# Patient Record
Sex: FEMALE | Race: WHITE | HISPANIC OR LATINO
[De-identification: names, ages, dates, MRNs, and addresses within clinical notes are randomized per-mention and may not be internally consistent; named-entity substitution may affect disease eponyms.]

---

## 2017-09-01 ENCOUNTER — APPOINTMENT (OUTPATIENT)
Dept: VASCULAR SURGERY | Facility: CLINIC | Age: 67
End: 2017-09-01
Payer: MEDICARE

## 2017-09-01 DIAGNOSIS — I83.819 VARICOSE VEINS OF UNSPECIFIED LOWER EXTREMITY WITH PAIN: ICD-10-CM

## 2017-09-01 PROCEDURE — 99213 OFFICE O/P EST LOW 20 MIN: CPT

## 2017-09-01 RX ORDER — FLUTICASONE PROPIONATE 110 UG/1
110 AEROSOL, METERED RESPIRATORY (INHALATION)
Qty: 12 | Refills: 0 | Status: ACTIVE | COMMUNITY
Start: 2017-03-09

## 2017-09-01 RX ORDER — KETOCONAZOLE 20 MG/G
2 CREAM TOPICAL
Qty: 30 | Refills: 0 | Status: ACTIVE | COMMUNITY
Start: 2016-12-14

## 2019-05-29 ENCOUNTER — APPOINTMENT (OUTPATIENT)
Dept: NEUROLOGY | Facility: CLINIC | Age: 69
End: 2019-05-29
Payer: MEDICARE

## 2019-05-29 VITALS
TEMPERATURE: 97.7 F | DIASTOLIC BLOOD PRESSURE: 80 MMHG | BODY MASS INDEX: 45.45 KG/M2 | HEART RATE: 60 BPM | WEIGHT: 247 LBS | HEIGHT: 62 IN | SYSTOLIC BLOOD PRESSURE: 141 MMHG | OXYGEN SATURATION: 96 %

## 2019-05-29 DIAGNOSIS — R42 DIZZINESS AND GIDDINESS: ICD-10-CM

## 2019-05-29 PROCEDURE — 99205 OFFICE O/P NEW HI 60 MIN: CPT

## 2019-05-29 RX ORDER — AZITHROMYCIN 250 MG/1
250 TABLET, FILM COATED ORAL
Qty: 6 | Refills: 0 | Status: COMPLETED | COMMUNITY
Start: 2017-05-19 | End: 2019-05-29

## 2019-05-29 RX ORDER — BENZONATATE 200 MG/1
200 CAPSULE ORAL
Qty: 10 | Refills: 0 | Status: DISCONTINUED | COMMUNITY
Start: 2017-03-09 | End: 2019-05-29

## 2019-05-29 RX ORDER — NEOMYCIN SULFATE, POLYMYXIN B SULFATE, HYDROCORTISONE 3.5; 10000; 1 MG/ML; [USP'U]/ML; MG/ML
1 SOLUTION/ DROPS AURICULAR (OTIC)
Qty: 10 | Refills: 0 | Status: DISCONTINUED | COMMUNITY
Start: 2017-03-09 | End: 2019-05-29

## 2019-05-29 RX ORDER — METOPROLOL SUCCINATE 25 MG/1
25 TABLET, EXTENDED RELEASE ORAL DAILY
Refills: 0 | Status: ACTIVE | COMMUNITY

## 2019-05-29 RX ORDER — POLYETHYLENE GLYCOL-3350, SODIUM CHLORIDE, POTASSIUM CHLORIDE AND SODIUM BICARBONATE 420; 11.2; 5.72; 1.48 G/438.4G; G/438.4G; G/438.4G; G/438.4G
420 POWDER, FOR SOLUTION ORAL
Qty: 1 | Refills: 0 | Status: DISCONTINUED | COMMUNITY
Start: 2017-04-14 | End: 2019-05-29

## 2019-05-29 NOTE — ASSESSMENT
[FreeTextEntry1] : Ms. Rahman suffers from a combination of lumbar spondylosis, probable bilateral carpal tunnel syndrome, CMC arthritis, and vertigo. \par Will check labs for neuropathy today, have her return for NCS/EMG\par Likely will refer to hand surgery after that\par There is no evidence of myelopathy on exam or MRI

## 2019-05-29 NOTE — CONSULT LETTER
[Dear  ___] : Dear  [unfilled], [Consult Letter:] : I had the pleasure of evaluating your patient, [unfilled]. [Please see my note below.] : Please see my note below. [Consult Closing:] : Thank you very much for allowing me to participate in the care of this patient.  If you have any questions, please do not hesitate to contact me. [Sincerely,] : Sincerely, [FreeTextEntry3] : Jayant Paige M.D.\par Neurology, Electromyography and Neuromuscular Medicine\par [facility]\par \par  of Neurology\par [facility] [employer]

## 2019-05-29 NOTE — PHYSICAL EXAM
[FreeTextEntry1] : Gen: appears well, well-nourished, no acute distress\par \par MS: awake, alert, speech fluent, comprehension intact, follows commands\par \par CN: PERRL, EOMI, visual fields full, facial strength and sensation intact and symmetric, palate elevation symmetric, tongue midline, no tongue atrophy or fasciculations\par \par Motor: bilateral thenar atrophy; APB 4/5 b/l; left FDI/ADM 4+ otherwise 5/5 throughout. no abnormal movements\par \par Sensory: vibration moderately reduced at toes and ankles; JPS intact toes b/l\par \par Reflexes: 2+ symmetric throughout except left patellar 1+, no Villalba’s sign, plantar responses flexor bilaterally\par \par Coordination: no dysmetria on finger to nose, Romberg negative\par \par Gait: narrow base, slow, overly cautious, not ataxic\par \par Skin: no rash on extremities\par \par Ext: no edema\par \par MSK: Tinel's sign absent at wrists and elbows; significant tenderness of CMC b/l; Finkelstein's test negative b/l

## 2019-05-30 ENCOUNTER — TRANSCRIPTION ENCOUNTER (OUTPATIENT)
Age: 69
End: 2019-05-30

## 2019-06-05 ENCOUNTER — APPOINTMENT (OUTPATIENT)
Dept: NEUROLOGY | Facility: CLINIC | Age: 69
End: 2019-06-05

## 2019-06-07 ENCOUNTER — TRANSCRIPTION ENCOUNTER (OUTPATIENT)
Age: 69
End: 2019-06-07

## 2019-06-07 LAB
ALBUMIN MFR SERPL ELPH: 53.3 %
ALBUMIN SERPL ELPH-MCNC: 4.4 G/DL
ALBUMIN SERPL-MCNC: 3.9 G/DL
ALBUMIN/GLOB SERPL: 1.1 RATIO
ALP BLD-CCNC: 113 U/L
ALPHA1 GLOB MFR SERPL ELPH: 4.5 %
ALPHA1 GLOB SERPL ELPH-MCNC: 0.3 G/DL
ALPHA2 GLOB MFR SERPL ELPH: 11.4 %
ALPHA2 GLOB SERPL ELPH-MCNC: 0.8 G/DL
ALT SERPL-CCNC: 19 U/L
ANION GAP SERPL CALC-SCNC: 13 MMOL/L
AST SERPL-CCNC: 16 U/L
B-GLOBULIN MFR SERPL ELPH: 13.7 %
B-GLOBULIN SERPL ELPH-MCNC: 1 G/DL
BASOPHILS # BLD AUTO: 0.04 K/UL
BASOPHILS NFR BLD AUTO: 0.5 %
BILIRUB SERPL-MCNC: 0.3 MG/DL
BUN SERPL-MCNC: 18 MG/DL
CALCIUM SERPL-MCNC: 9.5 MG/DL
CHLORIDE SERPL-SCNC: 104 MMOL/L
CO2 SERPL-SCNC: 23 MMOL/L
COPPER SERPL-MCNC: 149 UG/DL
CREAT SERPL-MCNC: 0.88 MG/DL
EOSINOPHIL # BLD AUTO: 0.11 K/UL
EOSINOPHIL NFR BLD AUTO: 1.3 %
ERYTHROCYTE [SEDIMENTATION RATE] IN BLOOD BY WESTERGREN METHOD: 49 MM/HR
ESTIMATED AVERAGE GLUCOSE: 117 MG/DL
FOLATE SERPL-MCNC: 4.2 NG/ML
GAMMA GLOB FLD ELPH-MCNC: 1.3 G/DL
GAMMA GLOB MFR SERPL ELPH: 17.1 %
GLUCOSE SERPL-MCNC: 89 MG/DL
HBA1C MFR BLD HPLC: 5.7 %
HCT VFR BLD CALC: 38.9 %
HGB BLD-MCNC: 11.9 G/DL
HOMOCYSTEINE LEVEL: 15 UMOL/L
IMM GRANULOCYTES NFR BLD AUTO: 0.3 %
INTERPRETATION SERPL IEP-IMP: NORMAL
LYMPHOCYTES # BLD AUTO: 2.85 K/UL
LYMPHOCYTES NFR BLD AUTO: 32.4 %
M PROTEIN SPEC IFE-MCNC: NORMAL
MAN DIFF?: NORMAL
MCHC RBC-ENTMCNC: 27.2 PG
MCHC RBC-ENTMCNC: 30.6 GM/DL
MCV RBC AUTO: 88.8 FL
METHYLMALONIC ACID LEVEL: 230 NMOL/L
MONOCYTES # BLD AUTO: 0.54 K/UL
MONOCYTES NFR BLD AUTO: 6.1 %
NEUTROPHILS # BLD AUTO: 5.22 K/UL
NEUTROPHILS NFR BLD AUTO: 59.4 %
PLATELET # BLD AUTO: 233 K/UL
POTASSIUM SERPL-SCNC: 4.5 MMOL/L
PROT SERPL-MCNC: 7.4 G/DL
RBC # BLD: 4.38 M/UL
RBC # FLD: 14.6 %
SODIUM SERPL-SCNC: 140 MMOL/L
TSH SERPL-ACNC: 1.54 UIU/ML
VIT B12 SERPL-MCNC: 646 PG/ML
WBC # FLD AUTO: 8.79 K/UL

## 2019-08-14 ENCOUNTER — APPOINTMENT (OUTPATIENT)
Dept: NEUROLOGY | Facility: CLINIC | Age: 69
End: 2019-08-14
Payer: MEDICARE

## 2019-08-14 VITALS
HEIGHT: 62 IN | OXYGEN SATURATION: 99 % | WEIGHT: 257 LBS | TEMPERATURE: 98.3 F | SYSTOLIC BLOOD PRESSURE: 136 MMHG | BODY MASS INDEX: 47.29 KG/M2 | HEART RATE: 67 BPM | DIASTOLIC BLOOD PRESSURE: 79 MMHG

## 2019-08-14 DIAGNOSIS — M25.531 PAIN IN RIGHT WRIST: ICD-10-CM

## 2019-08-14 DIAGNOSIS — M19.049 PRIMARY OSTEOARTHRITIS, UNSPECIFIED HAND: ICD-10-CM

## 2019-08-14 DIAGNOSIS — M25.532 PAIN IN RIGHT WRIST: ICD-10-CM

## 2019-08-14 DIAGNOSIS — R20.0 ANESTHESIA OF SKIN: ICD-10-CM

## 2019-08-14 PROCEDURE — 95886 MUSC TEST DONE W/N TEST COMP: CPT | Mod: 59

## 2019-08-14 PROCEDURE — 95913 NRV CNDJ TEST 13/> STUDIES: CPT

## 2019-08-14 PROCEDURE — 99214 OFFICE O/P EST MOD 30 MIN: CPT

## 2019-08-14 NOTE — HISTORY OF PRESENT ILLNESS
[FreeTextEntry1] : Symptoms are unchanged. She still has a lot of pain in her back along with numbness / tingling in the legs when she walks. She is taking tramadol which does help to some degree. \par She still has pain in her wrists, worse on the left, worse with activity.

## 2019-08-14 NOTE — PROCEDURE
[FreeTextEntry3] : Electro Physiologic Findings:\par \par Limb temperature was monitored and maintained at approximately 30 – 34° C in the lower extremities, and 32 – 36° C in the upper extremities.\par \par The right median mixed nerve response was slow across the wrist; the left median sensory velocities were borderline. Otherwise the median sensory and motor responses were normal bilaterally. The right lumbrical study was mildly positive, while on the left it was negative. \par \par The sural and superficial fibular sensory responses were normal. The right fibular motor amplitude was borderline, with normal velocity and F-wave latency. The right tibial motor amplitude was low, with prolonged F-wave latency. \par \par Needle electromyography was performed on select right upper and lower extremity appendicular muscles and the right C5/6 and L4/5 paraspinals. There was submaximal activation pattern in the tibialis anterior and peroneus longus muscles. Otherwise all muscles tested were normal without evidence of active or chronic denervation. \par \par Clinical Electrophysiological Impression: \par \par This electrodiagnostic study demonstrated a mild median nerve entrapment on the right. There were also findings that could be consistent with a chronic right S1 radiculopathy, or an early / mild motor-predominant polyneuropathy. There was no definite evidence of right cervical radiculopathy, although the sensitivity of this study for diagnosing radiculopathy is somewhat limited.  [FreeTextEntry1] : Nerve Conduction and Electromyography Report

## 2019-08-14 NOTE — PROCEDURE
[FreeTextEntry1] : Nerve Conduction and Electromyography Report [FreeTextEntry3] : Electro Physiologic Findings:\par \par Limb temperature was monitored and maintained at approximately 30 – 34° C in the lower extremities, and 32 – 36° C in the upper extremities.\par \par The right median mixed nerve response was slow across the wrist; the left median sensory velocities were borderline. Otherwise the median sensory and motor responses were normal bilaterally. The right lumbrical study was mildly positive, while on the left it was negative. \par \par The sural and superficial fibular sensory responses were normal. The right fibular motor amplitude was borderline, with normal velocity and F-wave latency. The right tibial motor amplitude was low, with prolonged F-wave latency. \par \par Needle electromyography was performed on select right upper and lower extremity appendicular muscles and the right C5/6 and L4/5 paraspinals. There was submaximal activation pattern in the tibialis anterior and peroneus longus muscles. Otherwise all muscles tested were normal without evidence of active or chronic denervation. \par \par Clinical Electrophysiological Impression: \par \par This electrodiagnostic study demonstrated a mild median nerve entrapment on the right. There were also findings that could be consistent with a chronic right S1 radiculopathy, or an early / mild motor-predominant polyneuropathy. There was no definite evidence of right cervical radiculopathy, although the sensitivity of this study for diagnosing radiculopathy is somewhat limited.

## 2019-08-14 NOTE — PHYSICAL EXAM
[FreeTextEntry1] : Gen: appears well, well-nourished, no acute distress\par \par MS: awake, alert, speech fluent, comprehension intact, follows commands\par \par Gait: antalgic, needs cane, stops due to severe pain after every few steps\par \par MSK: significant tenderness of CMC b/l

## 2019-08-14 NOTE — ASSESSMENT
[FreeTextEntry1] : Back pain due to lumbar spondylosis and spinal stenosis, without radiculopathy - significantly limiting her gait, can only walk a few steps before stopping\par This condition is likely permanent and is causing significant disability\par \par NCS/EMG performed today did not show significant radiculopathy in either the right arm or right leg. There was mild median nerve entrapment on the right, but her wrist pain is worse on the left where there was no median nerve entrapment. I suspect the wrist pain is predominantly due to arthritis and not carpal tunnel syndrome. \par \par I recommended that she see her rheumatologist for arthritis treatment. She has f/u with Dr. Chan to discuss potential surgical options for lumbar spine. \par \par See separate procedure note for full results of study.

## 2019-08-23 ENCOUNTER — TRANSCRIPTION ENCOUNTER (OUTPATIENT)
Age: 69
End: 2019-08-23

## 2021-09-10 ENCOUNTER — APPOINTMENT (OUTPATIENT)
Dept: NEUROLOGY | Facility: CLINIC | Age: 71
End: 2021-09-10
Payer: MEDICARE

## 2021-09-10 VITALS
TEMPERATURE: 98 F | HEART RATE: 74 BPM | DIASTOLIC BLOOD PRESSURE: 85 MMHG | OXYGEN SATURATION: 96 % | SYSTOLIC BLOOD PRESSURE: 126 MMHG

## 2021-09-10 DIAGNOSIS — E53.8 DEFICIENCY OF OTHER SPECIFIED B GROUP VITAMINS: ICD-10-CM

## 2021-09-10 DIAGNOSIS — R26.89 OTHER ABNORMALITIES OF GAIT AND MOBILITY: ICD-10-CM

## 2021-09-10 PROCEDURE — 99214 OFFICE O/P EST MOD 30 MIN: CPT

## 2021-09-10 RX ORDER — FOLIC ACID 5 MG
5 CAPSULE ORAL
Qty: 30 | Refills: 5 | Status: ACTIVE | COMMUNITY
Start: 2019-06-07 | End: 1900-01-01

## 2021-09-10 NOTE — HISTORY OF PRESENT ILLNESS
[FreeTextEntry1] : Last seen 2 years ago\par She has similar symptoms but more severe - back pain, leg pain and numbness when walking, relieved by rest\par She uses exercise bike at home for 90 minutes a day \par \par

## 2021-09-10 NOTE — ASSESSMENT
[FreeTextEntry1] : Likely worsening of lumbar stenosis\par Will get updated MRI L spine\par Refill tramadol, folic acid \par f/u after imaging

## 2021-09-13 ENCOUNTER — FORM ENCOUNTER (OUTPATIENT)
Age: 71
End: 2021-09-13

## 2021-09-27 ENCOUNTER — TRANSCRIPTION ENCOUNTER (OUTPATIENT)
Age: 71
End: 2021-09-27

## 2021-10-03 ENCOUNTER — TRANSCRIPTION ENCOUNTER (OUTPATIENT)
Age: 71
End: 2021-10-03

## 2021-10-29 ENCOUNTER — APPOINTMENT (OUTPATIENT)
Dept: NEUROLOGY | Facility: CLINIC | Age: 71
End: 2021-10-29

## 2021-12-01 ENCOUNTER — FORM ENCOUNTER (OUTPATIENT)
Age: 71
End: 2021-12-01

## 2021-12-17 ENCOUNTER — NON-APPOINTMENT (OUTPATIENT)
Age: 71
End: 2021-12-17

## 2022-02-08 ENCOUNTER — APPOINTMENT (OUTPATIENT)
Dept: COLORECTAL SURGERY | Facility: CLINIC | Age: 72
End: 2022-02-08

## 2022-03-21 ENCOUNTER — TRANSCRIPTION ENCOUNTER (OUTPATIENT)
Age: 72
End: 2022-03-21

## 2022-04-05 ENCOUNTER — NON-APPOINTMENT (OUTPATIENT)
Age: 72
End: 2022-04-05

## 2022-04-07 ENCOUNTER — APPOINTMENT (OUTPATIENT)
Dept: NEUROLOGY | Facility: AMBULATORY SURGERY CENTER | Age: 72
End: 2022-04-07
Payer: MEDICARE

## 2022-04-07 VITALS
HEIGHT: 62 IN | OXYGEN SATURATION: 97 % | WEIGHT: 253 LBS | SYSTOLIC BLOOD PRESSURE: 125 MMHG | BODY MASS INDEX: 46.56 KG/M2 | TEMPERATURE: 97.9 F | DIASTOLIC BLOOD PRESSURE: 83 MMHG | HEART RATE: 70 BPM

## 2022-04-07 DIAGNOSIS — G62.9 POLYNEUROPATHY, UNSPECIFIED: ICD-10-CM

## 2022-04-07 PROCEDURE — 95886 MUSC TEST DONE W/N TEST COMP: CPT

## 2022-04-07 PROCEDURE — 95911 NRV CNDJ TEST 9-10 STUDIES: CPT

## 2022-04-07 NOTE — PROCEDURE
[FreeTextEntry1] : EMG/NCS [FreeTextEntry3] : Please see separately uploaded report. Dr Paige's patient came for EMG as disability parking permit was denied without updated study.  It showed bilateral L5 and S1 radiculopathy and mild polyneuropathy.\par \par No clinical services performed

## 2022-04-08 ENCOUNTER — TRANSCRIPTION ENCOUNTER (OUTPATIENT)
Age: 72
End: 2022-04-08

## 2022-04-11 ENCOUNTER — TRANSCRIPTION ENCOUNTER (OUTPATIENT)
Age: 72
End: 2022-04-11

## 2022-04-11 ENCOUNTER — NON-APPOINTMENT (OUTPATIENT)
Age: 72
End: 2022-04-11

## 2022-04-11 DIAGNOSIS — M48.061 SPINAL STENOSIS, LUMBAR REGION WITHOUT NEUROGENIC CLAUDICATION: ICD-10-CM

## 2022-04-13 ENCOUNTER — TRANSCRIPTION ENCOUNTER (OUTPATIENT)
Age: 72
End: 2022-04-13

## 2022-04-14 ENCOUNTER — TRANSCRIPTION ENCOUNTER (OUTPATIENT)
Age: 72
End: 2022-04-14

## 2022-04-14 PROBLEM — M48.061 DEGENERATIVE LUMBAR SPINAL STENOSIS: Status: ACTIVE | Noted: 2019-05-29

## 2022-04-18 ENCOUNTER — TRANSCRIPTION ENCOUNTER (OUTPATIENT)
Age: 72
End: 2022-04-18

## 2022-05-05 ENCOUNTER — APPOINTMENT (OUTPATIENT)
Dept: NEUROLOGY | Facility: AMBULATORY SURGERY CENTER | Age: 72
End: 2022-05-05
Payer: MEDICARE

## 2022-05-05 PROCEDURE — 99214 OFFICE O/P EST MOD 30 MIN: CPT

## 2022-05-05 NOTE — HISTORY OF PRESENT ILLNESS
[FreeTextEntry1] : ALISIA BENTLEY is a 72 year who presents for follow up\par \par EMG caused a lot of numbness for days.  Also one day severe left flank pain going to groin, kidney stone suggested at urgent care but not confirmed on CT. did have blood in urine.  pain was present for 1 day.\par \par also dropping things, some numbness in hands, wrist pain wakes her up at night.\par \par Denies diplopia, blurred vision, dysphagia, dysarthria, aphasia, focal weakness or numbness, bowel or bladder dysfunction, imbalance, falls, headaches.\par

## 2022-05-12 NOTE — REASON FOR VISIT
[FreeTextEntry1] : 86yo female with cc of recurrent UTI. Pt reports that she has had issues over the last 10 -15 years  with recurrent infection. Had ? 6  infections last year getting every 1-2mo. Sx during an infection include increased frequency and feelings of incomplete emptying and urgency related UI. Unclear if cx proven, per pt report it is. Last cx sent 2d ago. No febrile infections. Last on abx completed 2-3 week ago.\par \par She drinks 24 oz of water and cranberry cocktail with D-mannose powder approx 24 oz or so, 1 Cup of coffee She has to remind herself to drink water. Has holding pattern since childhood. She denies  hx of stones. Former smoker She voids X 3 or 4   times during the day and nocturia X 1-2 with UUI requiring pads - varying from 3 to 10 if UTI .PMH of doing CIC for few months and then told no need to do CIC. Breast cancer ? 2019 invasive lobular carcinoma in situ . \par PVR 47 ml\par \par Renal Imaging on chart 2007 showed non obstructing 4 mm stone in Left kidney ( pt was not aware of same) .\par \par 
[Consultation] : a consultation visit

## 2022-05-13 ENCOUNTER — APPOINTMENT (OUTPATIENT)
Dept: NEUROLOGY | Facility: CLINIC | Age: 72
End: 2022-05-13

## 2022-08-29 ENCOUNTER — NON-APPOINTMENT (OUTPATIENT)
Age: 72
End: 2022-08-29

## 2022-08-30 ENCOUNTER — APPOINTMENT (OUTPATIENT)
Dept: COLORECTAL SURGERY | Facility: CLINIC | Age: 72
End: 2022-08-30

## 2022-08-30 DIAGNOSIS — Z87.19 PERSONAL HISTORY OF OTHER DISEASES OF THE DIGESTIVE SYSTEM: ICD-10-CM

## 2022-08-30 DIAGNOSIS — Z86.79 PERSONAL HISTORY OF OTHER DISEASES OF THE CIRCULATORY SYSTEM: ICD-10-CM

## 2022-08-30 DIAGNOSIS — G47.30 SLEEP APNEA, UNSPECIFIED: ICD-10-CM

## 2022-08-30 PROCEDURE — 99203 OFFICE O/P NEW LOW 30 MIN: CPT

## 2022-08-30 NOTE — HISTORY OF PRESENT ILLNESS
[FreeTextEntry1] : 72 year old female for screening colonoscopy. last scope was by Frank at [facility] in 2017.  Prep was fair. No change in bowel habits, no pain, no bleeding,  doing  fairly well.

## 2022-08-30 NOTE — ASSESSMENT
[FreeTextEntry1] : 72 year old female with history of sleep apnea,  CAD and  vertigo for screening colonoscopy.\par Needs full medical clearance for anesthesia. We also discussed that perhaps she should have someone stay with her during the prep because of her history of vertigo.  she will consider it.

## 2022-08-30 NOTE — PHYSICAL EXAM
[FreeTextEntry1] : PE: \par \par AOA, obese female, ambulatory with cane. \par NO nodes, no JVD\par chest: CTA bilaterally\par CV: s1s2, RRR\par abdomen: obese, soft, non tender, well healed midline scar no hernias. \par ext: no edema, no calf tenderness

## 2022-08-30 NOTE — HISTORY OF PRESENT ILLNESS
[FreeTextEntry1] : 72 year old female for screening colonoscopy. last scope was by Frank at [facility] in 2017.  Prep was fair. No change in bowel habits, no pain, no bleeding,  doing  fairly well.

## 2022-08-30 NOTE — REVIEW OF SYSTEMS
[Negative] : Gastrointestinal [Fever] : no fever [Chills] : no chills [Feeling Poorly] : not feeling poorly [Feeling Tired] : not feeling tired [Recent Weight Gain (___ Lbs)] : no recent weight gain [Recent Weight Loss (___ Lbs)] : no recent weight loss [Heart Rate Is Slow] : the heart rate was not slow [Chest Pain] : no chest pain [Shortness Of Breath] : no shortness of breath [Wheezing] : no wheezing [Cough] : no cough [SOB on Exertion] : no shortness of breath during exertion [FreeTextEntry5] : recent echo, no mI , no chest pain

## 2022-09-27 DIAGNOSIS — Z20.822 ENCOUNTER FOR PREPROCEDURAL LABORATORY EXAMINATION: ICD-10-CM

## 2022-09-27 DIAGNOSIS — Z01.812 ENCOUNTER FOR PREPROCEDURAL LABORATORY EXAMINATION: ICD-10-CM

## 2022-09-28 RX ORDER — POLYETHYLENE GLYCOL 3350, SODIUM CHLORIDE, SODIUM BICARBONATE AND POTASSIUM CHLORIDE WITH LEMON FLAVOR 420; 11.2; 5.72; 1.48 G/4L; G/4L; G/4L; G/4L
420 POWDER, FOR SOLUTION ORAL
Qty: 1 | Refills: 0 | Status: ACTIVE | COMMUNITY
Start: 2022-09-28 | End: 1900-01-01

## 2022-10-03 LAB — SARS-COV-2 N GENE NPH QL NAA+PROBE: NOT DETECTED

## 2022-10-05 ENCOUNTER — RESULT REVIEW (OUTPATIENT)
Age: 72
End: 2022-10-05

## 2022-10-05 ENCOUNTER — APPOINTMENT (OUTPATIENT)
Dept: COLORECTAL SURGERY | Facility: AMBULATORY SURGERY CENTER | Age: 72
End: 2022-10-05

## 2022-10-05 PROCEDURE — 45380 COLONOSCOPY AND BIOPSY: CPT

## 2022-10-10 ENCOUNTER — NON-APPOINTMENT (OUTPATIENT)
Age: 72
End: 2022-10-10

## 2023-02-21 ENCOUNTER — TRANSCRIPTION ENCOUNTER (OUTPATIENT)
Age: 73
End: 2023-02-21

## 2023-03-22 ENCOUNTER — APPOINTMENT (OUTPATIENT)
Age: 73
End: 2023-03-22
Payer: MEDICARE

## 2023-03-22 VITALS
WEIGHT: 236 LBS | OXYGEN SATURATION: 96 % | HEIGHT: 62 IN | BODY MASS INDEX: 43.43 KG/M2 | HEART RATE: 62 BPM | DIASTOLIC BLOOD PRESSURE: 79 MMHG | SYSTOLIC BLOOD PRESSURE: 138 MMHG | TEMPERATURE: 98 F

## 2023-03-22 PROCEDURE — 99214 OFFICE O/P EST MOD 30 MIN: CPT

## 2023-03-22 NOTE — HISTORY OF PRESENT ILLNESS
[FreeTextEntry1] : ALISIA BENTLEY is a 73 year who returns to follow up for \par \par last visit was 5/5/2022.  at that time she had returned after my emg due to days of leg numbness and pain.  I also suggested nocturnal splinting for CTS\par \par since last visit had MRI L spine.  legs have been bad. still severely limited walking\par \par left hand is about the same but she forgot to try the splint\par \par CPAP broke and having major issues since then. \par \par she has been hearing music all the time. can be different kinds of music.  can be music that she recently heard.  has been having some more headaches. also ear pain on the phone. no visual hallucination.  not hearing voices. seeing ENT soon to f/u on poor hearing.\par \par \par

## 2023-03-22 NOTE — PHYSICAL EXAM
[FreeTextEntry1] : General: this is a pleasant patient in no acute distress\par \par HEENT conjunctiva are normal, no tenderness in head\par \par CV: normal pulses, regular rate and rhythm\par \par Lungs: breathing is non-labored\par \par abd: soft and non-distended\par \par MSK:\par SLR: neg\par ALIZE: neg\par range of motion: restricted L spine\par tinnels: neg at wrists\par spurling:\par Occipital nerve tenderness:\par \par Mental status:\par Alert and oriented to person, place and time, normal speech and comprehension\par \par Cranial Nerves:\par extra-occular movements in tact without nystagmus, normal saccades and smooth pursuit, Face symmetric and facial strength symmetric, facial sensation symmetric, \par \par Motor: normal bulk and tone throughout. no abnormal movements.  Full 5/5 strength uppers and lower extremities proximally and distally except L APB 4+5, b/l hip flexion 4+.5, L knee ext 4/5, L foot dorsi 4+/5\par \par Sensory: in tact and symmetric to vibration, light tough, temperature\par \par Cerebellar: normal finger-nose-finger bilaterally\par \par Reflexes: 2+ in the upper and lower extremities and symmetric.  toes are bilaterally downgoing.\par \par Gait: stable, antalgic and slow\par \par Stances:\par Romberg: sway\par \par \par

## 2023-03-29 ENCOUNTER — TRANSCRIPTION ENCOUNTER (OUTPATIENT)
Age: 73
End: 2023-03-29

## 2023-05-26 ENCOUNTER — APPOINTMENT (OUTPATIENT)
Age: 73
End: 2023-05-26
Payer: MEDICARE

## 2023-05-26 VITALS
RESPIRATION RATE: 16 BRPM | BODY MASS INDEX: 45.27 KG/M2 | OXYGEN SATURATION: 98 % | TEMPERATURE: 98.1 F | WEIGHT: 246 LBS | SYSTOLIC BLOOD PRESSURE: 126 MMHG | HEIGHT: 62 IN | HEART RATE: 68 BPM | DIASTOLIC BLOOD PRESSURE: 75 MMHG

## 2023-05-26 DIAGNOSIS — M54.16 RADICULOPATHY, LUMBAR REGION: ICD-10-CM

## 2023-05-26 PROCEDURE — 99214 OFFICE O/P EST MOD 30 MIN: CPT

## 2023-05-26 NOTE — PHYSICAL EXAM
[FreeTextEntry1] : General: this is a pleasant patient in no acute distress\par \par HEENT conjunctiva are normal, no tenderness in head\par \par CV: normal pulses, regular rate and rhythm\par \par Lungs: breathing is non-labored\par \par abd: soft and non-distended\par \par MSK:\par SLR: neg\par ALIZE: neg\par range of motion: restricted L spine\par tinnels: neg at wrists\par spurling:\par Occipital nerve tenderness:\par \par Mental status:\par Alert and oriented to person, place and time, normal speech and comprehension\par \par Cranial Nerves:\par extra-occular movements in tact without nystagmus, normal saccades and smooth pursuit, Face symmetric and facial strength symmetric, facial sensation symmetric, \par \par Motor: normal bulk and tone throughout. no abnormal movements.  L APB 4+5, b/l hip flexion 4+/5, L knee ext 4/5, L foot dorsi 4+/5\par \par Sensory: in tact and symmetric to vibration, light tough, temperature excetpt decreased PP in feet\par \par Cerebellar: normal finger-nose-finger bilaterally\par \par Reflexes: 2+ in the upper and lower extremities and symmetric.  toes are bilaterally downgoing.\par \par Gait: stable, antalgic and slow, needs quad cane\par \par Stances:\par Romberg: sway\par \par \par

## 2023-05-26 NOTE — HISTORY OF PRESENT ILLNESS
[FreeTextEntry1] : ALISIA BENTLEY is a 73 year who returns to follow up for hallucinations and lumbar radiculopathy and CTS\par \par last visit was 3/22/2023\par \par since last visit has been wearing splint but still drops things a lot.\par \par needed lymph not biopsy but not cancer.\par \par got hearing aids.  very happy with them and life is better with them.\par \par back on CPAP. sleeping much better. does get nightmares.\par \par she thinks she is able to ignore the auditory hallucinations.  more at night but also during the day.  \par

## 2023-06-06 ENCOUNTER — TRANSCRIPTION ENCOUNTER (OUTPATIENT)
Age: 73
End: 2023-06-06

## 2023-06-07 ENCOUNTER — APPOINTMENT (OUTPATIENT)
Age: 73
End: 2023-06-07
Payer: MEDICARE

## 2023-06-07 DIAGNOSIS — G56.02 CARPAL TUNNEL SYNDROME, LEFT UPPER LIMB: ICD-10-CM

## 2023-06-07 DIAGNOSIS — G44.86 CERVICOGENIC HEADACHE: ICD-10-CM

## 2023-06-07 DIAGNOSIS — R44.0 AUDITORY HALLUCINATIONS: ICD-10-CM

## 2023-06-07 PROCEDURE — 99214 OFFICE O/P EST MOD 30 MIN: CPT | Mod: 95

## 2023-06-07 NOTE — CONSULT LETTER
[Dear  ___] : Dear  [unfilled], [Courtesy Letter:] : I had the pleasure of seeing your patient, [unfilled], in my office today. [Please see my note below.] : Please see my note below. [Consult Closing:] : Thank you very much for allowing me to participate in the care of this patient.  If you have any questions, please do not hesitate to contact me. [Sincerely,] : Sincerely, [FreeTextEntry3] : Brian Briones MD

## 2023-06-07 NOTE — PHYSICAL EXAM
[FreeTextEntry1] : MS AAO x 3, normal speech and comprehension\par CN EOMI\par moves all ext symmetrically

## 2023-06-07 NOTE — HISTORY OF PRESENT ILLNESS
[FreeTextEntry1] : ALISIA BENTLEY is a 73 year who returns to follow up for hallucinations and lumbar radiculopathy and CTS\par \par last visit was 5/26/2023\par \par since last visit had MRI, here for review of test\par \par having temporal headache on the right more often using phone, somewhat better with hearing aids.  \par \par

## 2023-06-08 ENCOUNTER — TRANSCRIPTION ENCOUNTER (OUTPATIENT)
Age: 73
End: 2023-06-08

## 2023-06-29 ENCOUNTER — APPOINTMENT (OUTPATIENT)
Dept: PAIN MANAGEMENT | Facility: CLINIC | Age: 73
End: 2023-06-29
Payer: MEDICARE

## 2023-06-29 VITALS
RESPIRATION RATE: 18 BRPM | WEIGHT: 245 LBS | HEART RATE: 63 BPM | SYSTOLIC BLOOD PRESSURE: 133 MMHG | DIASTOLIC BLOOD PRESSURE: 74 MMHG | BODY MASS INDEX: 45.08 KG/M2 | HEIGHT: 62 IN | OXYGEN SATURATION: 96 %

## 2023-06-29 DIAGNOSIS — M54.12 RADICULOPATHY, CERVICAL REGION: ICD-10-CM

## 2023-06-29 PROCEDURE — 99203 OFFICE O/P NEW LOW 30 MIN: CPT

## 2023-07-11 ENCOUNTER — APPOINTMENT (OUTPATIENT)
Dept: PAIN MANAGEMENT | Facility: CLINIC | Age: 73
End: 2023-07-11
Payer: MEDICARE

## 2023-07-11 VITALS
HEIGHT: 62 IN | HEART RATE: 68 BPM | RESPIRATION RATE: 18 BRPM | WEIGHT: 245 LBS | OXYGEN SATURATION: 98 % | DIASTOLIC BLOOD PRESSURE: 73 MMHG | SYSTOLIC BLOOD PRESSURE: 132 MMHG | BODY MASS INDEX: 45.08 KG/M2

## 2023-07-11 DIAGNOSIS — M47.812 SPONDYLOSIS W/OUT MYELOPATHY OR RADICULOPATHY, CERVICAL REGION: ICD-10-CM

## 2023-07-11 PROCEDURE — 64490 INJ PARAVERT F JNT C/T 1 LEV: CPT | Mod: RT

## 2023-07-11 PROCEDURE — 99214 OFFICE O/P EST MOD 30 MIN: CPT | Mod: 25

## 2023-07-11 PROCEDURE — 64491 INJ PARAVERT F JNT C/T 2 LEV: CPT | Mod: RT

## 2023-07-11 NOTE — ASSESSMENT
[FreeTextEntry1] : Ms Rahman is a 73 year old female with chronic axial and radicular neck pain 2/2 cervical spondylosis  as well as neural foraminal stenosis. \par \par She is s/p cervical MBB in 06/23 with excellent relief. Today, pain noted to be returning. Plan for another CMBB in the office under fluoro guidance with future plan for ablation.

## 2023-07-11 NOTE — PHYSICAL EXAM
[Spinous Process Tenderness] : no spinous process tenderness [Facet Tenderness] : facet tenderness [Paraspinal Tenderness] : paraspinal tenderness [Sacroiliac tenderness] : no sacroiliac tenderness [Cane] : ambulates with cane [Spurling] : negative Spurling's Test [Villalba's Sign] : negative Villalba's Sign [ALL] : Biceps, brachioradialis, triceps, patellar, and achilles 2+ and symmetric bilaterally [Normal] : Skin color, texture, turgor normal, no rashes or lesions in the four extremities and back [de-identified] : m [de-identified] : limited range of motion of c spine extension to 40 degrees [de-identified] : 5/5 motor strength in BUE and BLE, sensation grossly intact

## 2023-07-11 NOTE — HISTORY OF PRESENT ILLNESS
[FreeTextEntry1] : Patient is known to the clinic and referral from neurologist, Dr. Briones. \par \par Ms Rahman is a 73 year old female with an extensive past medical history including chronic neck pain (R>L) that began years ago without an inciting event. She is s/p right-sded C2-C5 cervical medial branch block under fluoro guidance on June 29, 2023. She presents as  follow up post-procedure. \par \par Today, she is very anxious. She reports almost complete resolution of neck pain right after her cervical MBB and says her symptoms are slowly returning. Currently, she has  4/10 dull pain today but overall feels much better than before her block. Pain worse with head lateral and vertical head movements. Pain improved with PO tylenol. Completed PT for 3 months but has since stopped due to symptoms not improving. \par \par Ms. Rahman desires another MBB today. \par  [Back Pain] : back pain [Neck Pain] : neck pain [___ yrs] : [unfilled] year(s) ago [Constant] : constant [5] : an average pain level of 5/10 [4] : a minimum pain level of 4/10 [10] : a maximum pain level of 10/10 [Dull] : dull [Aching] : aching [Walking] : walking [Turning Head] : turning head [Looking Up] : looking up [Looking Down] : looking down [Medications] : medications

## 2023-07-11 NOTE — REVIEW OF SYSTEMS
[Back Pain] : back pain [Neck Pain] : neck pain [Muscle Pain] : muscle pain [Radiating Pain] : no radiating pain [Decreased ROM] : decreased range of motion [Weakness] : no weakness [Anxiety] : anxiety [Sleep Disturbances] : ~T sleep disturbances [Suicidal] : not suicidal [Negative] : Heme/Lymph

## 2023-07-13 ENCOUNTER — TRANSCRIPTION ENCOUNTER (OUTPATIENT)
Age: 73
End: 2023-07-13

## 2023-07-17 ENCOUNTER — TRANSCRIPTION ENCOUNTER (OUTPATIENT)
Age: 73
End: 2023-07-17

## 2023-09-06 ENCOUNTER — TRANSCRIPTION ENCOUNTER (OUTPATIENT)
Age: 73
End: 2023-09-06

## 2023-09-08 ENCOUNTER — APPOINTMENT (OUTPATIENT)
Dept: PAIN MANAGEMENT | Facility: CLINIC | Age: 73
End: 2023-09-08

## 2023-10-01 PROBLEM — G47.30 SLEEP APNEA WITH USE OF CONTINUOUS POSITIVE AIRWAY PRESSURE (CPAP): Status: RESOLVED | Noted: 2022-08-30 | Resolved: 2023-10-01

## 2023-10-12 PROBLEM — M47.812 CERVICAL SPONDYLOSIS: Status: ACTIVE | Noted: 2023-06-29

## 2023-12-27 NOTE — REVIEW OF SYSTEMS
[Back Pain] : back pain [Neck Pain] : neck pain [Muscle Pain] : muscle pain [Radiating Pain] : radiating pain [Joint Stiffness] : joint stiffness [Decreased ROM] : decreased range of motion [Negative] : Heme/Lymph [Joint Pain] : no joint pain [Weakness] : no weakness

## 2023-12-27 NOTE — HISTORY OF PRESENT ILLNESS
[Neck Pain] : neck pain [___ yrs] : [unfilled] year(s) ago [Constant] : constant [8] : an average pain level of 8/10 [7] : a minimum pain level of 7/10 [10] : a maximum pain level of 10/10 [Sharp] : sharp [Turning Head] : turning head [Medications] : medications [Rest] : rest [FreeTextEntry1] : 74yo F presents with chronic neck pain (R>L) that began years ago without inciting event. Describes pain to be constant stiffness that occasionally radiates into BUE with associates numbness/tingling. She also reports headaches when neck pain is severe. Rates pain 8/10. Pain worse when sleeping on the right side. Takes Tylenol when pain severe which provides temporary relief. Has previously participated in PT for >3 months however that has not helped in the past. She had MRI C spine in 2019 whch demonstrated multilevel deg changes, multilevel neural foraminal narrowing, worst/mod-severe on the right C5-6. No prior injection. Evaluated by neurologist Dr Briones who referred patient for evaluation

## 2023-12-27 NOTE — PHYSICAL EXAM
[Normal] : Regular, no tachycardia [Facet Tenderness] : facet tenderness [Paraspinal Tenderness] : paraspinal tenderness [Cane] : ambulates with cane [UE] : Sensory: Intact in bilateral upper extremities [Bicep] : biceps 2+ and symmetric bilaterally [B.R.] : Brachioradialis 2+ and symmetric bilaterally [Spinous Process Tenderness] : no spinous process tenderness [Spurling] : negative Spurling's Test [Villalba's Sign] : negative Villalba's Sign [de-identified] : +ttp cervical paraspinals (R>L) [de-identified] : limited AROM C spine extension to 40 deg

## 2023-12-27 NOTE — ASSESSMENT
[FreeTextEntry1] : 72yo F with chronic axial and radicular neck pain. SUspect etiology of pain symptoms multifactorial 2/2 cervical spondylosis as well as neural foraminal stenosis. Conservative care including OTC analgesics as well as physician directed therapy for many months without improvement in pain symptoms. Patient states axial pain symptoms are worse and more limiting. We discussed trial right CMBB to see if that provides some relief. If no improvement, may consider MICHAEL

## 2024-03-27 ENCOUNTER — NON-APPOINTMENT (OUTPATIENT)
Age: 74
End: 2024-03-27

## 2024-04-01 ENCOUNTER — APPOINTMENT (OUTPATIENT)
Dept: COLORECTAL SURGERY | Facility: CLINIC | Age: 74
End: 2024-04-01
Payer: MEDICARE

## 2024-04-01 VITALS
DIASTOLIC BLOOD PRESSURE: 83 MMHG | HEIGHT: 62 IN | TEMPERATURE: 98.1 F | HEART RATE: 82 BPM | SYSTOLIC BLOOD PRESSURE: 146 MMHG | OXYGEN SATURATION: 97 %

## 2024-04-01 PROCEDURE — 99212 OFFICE O/P EST SF 10 MIN: CPT

## 2024-04-01 PROCEDURE — 99202 OFFICE O/P NEW SF 15 MIN: CPT

## 2024-04-01 NOTE — HISTORY OF PRESENT ILLNESS
[FreeTextEntry1] : 75 y/o F with PMHx of 4cm left renal cyst (stable), BPPV, OAB, recurrent yeast infections, Diverticulitis s/p rectosigmoid resection with ileostomy on 6/5/2000 and ileostomy closure on 2/13/2002. She had parastomal hernia and was repaired during the ileostomy closure. She is here for evaluation of pain on the ileostomy site for two weeks. Pain was like spasm and cold feeling. However, pain is gone now. Denies any vomiting. She has daily mild nausea related to vertigo.  Bowel movements are daily x2. Stools are soft. Denies rectal bleeding. Taking Metamucil every day.   Last colonoscopy 10/5/2022. Had 5-7 mm adenoma polyp in sigmoid colon.

## 2024-04-01 NOTE — REVIEW OF SYSTEMS
[Feeling Poorly] : not feeling poorly [Feeling Tired] : not feeling tired [Melena] : no melena [Vaginal Discharge] : no vaginal discharge [Abn Vaginal Bleeding] : no unexplained vaginal bleeding [Joint Swelling] : no joint swelling [Joint Stiffness] : no joint stiffness [Limb Swelling] : no limb swelling [Itching] : no itching [Change In A Mole] : no change in a mole [FreeTextEntry8] : OAB [Dizziness] : dizziness [Negative] : Heme/Lymph [Fever] : no fever [Chills] : no chills [Recent Weight Gain (___ Lbs)] : no recent weight gain [Recent Weight Loss (___ Lbs)] : no recent weight loss [Nosebleeds] : no nosebleeds [Nasal Discharge] : no nasal discharge [Sore Throat] : no sore throat [Chest Pain] : no chest pain [Palpitations] : no palpitations [Leg Claudication] : no intermittent leg claudication [Lower Ext Edema] : no lower extremity edema [Wheezing] : no wheezing [Shortness Of Breath] : no shortness of breath [Cough] : no cough [SOB on Exertion] : no shortness of breath during exertion [Abdominal Pain] : no abdominal pain [Vomiting] : no vomiting [Constipation] : no constipation [Diarrhea] : no diarrhea [Heartburn] : no heartburn [Dysuria] : no dysuria [Pelvic Pain] : no pelvic pain [Incontinence] : no incontinence [Dysmenorrhea] : no dysmenorrhea [Confused] : no confusion [Fainting] : no fainting [Convulsions] : no convulsions [Suicidal] : not suicidal [Sleep Disturbances] : no sleep disturbances [Anxiety] : no anxiety [Depression] : no depression [Easy Bleeding] : no tendency for easy bleeding [Easy Bruising] : no tendency for easy bruising [FreeTextEntry4] : hard of hearing [FreeTextEntry9] : low back pain  [de-identified] : vertigo

## 2024-04-01 NOTE — REVIEW OF SYSTEMS
[Feeling Poorly] : not feeling poorly [Feeling Tired] : not feeling tired [Melena] : no melena [Vaginal Discharge] : no vaginal discharge [Abn Vaginal Bleeding] : no unexplained vaginal bleeding [Joint Swelling] : no joint swelling [Joint Stiffness] : no joint stiffness [Limb Swelling] : no limb swelling [Itching] : no itching [Change In A Mole] : no change in a mole [FreeTextEntry8] : OAB [Dizziness] : dizziness [Negative] : Heme/Lymph [Fever] : no fever [Chills] : no chills [Recent Weight Gain (___ Lbs)] : no recent weight gain [Recent Weight Loss (___ Lbs)] : no recent weight loss [Nosebleeds] : no nosebleeds [Nasal Discharge] : no nasal discharge [Sore Throat] : no sore throat [Chest Pain] : no chest pain [Palpitations] : no palpitations [Leg Claudication] : no intermittent leg claudication [Lower Ext Edema] : no lower extremity edema [Wheezing] : no wheezing [Shortness Of Breath] : no shortness of breath [Cough] : no cough [SOB on Exertion] : no shortness of breath during exertion [Abdominal Pain] : no abdominal pain [Vomiting] : no vomiting [Constipation] : no constipation [Diarrhea] : no diarrhea [Heartburn] : no heartburn [Dysuria] : no dysuria [Incontinence] : no incontinence [Pelvic Pain] : no pelvic pain [Dysmenorrhea] : no dysmenorrhea [Confused] : no confusion [Convulsions] : no convulsions [Fainting] : no fainting [Suicidal] : not suicidal [Sleep Disturbances] : no sleep disturbances [Anxiety] : no anxiety [Depression] : no depression [Easy Bleeding] : no tendency for easy bleeding [Easy Bruising] : no tendency for easy bruising [FreeTextEntry4] : hard of hearing [FreeTextEntry9] : low back pain  [de-identified] : vertigo

## 2024-04-01 NOTE — PHYSICAL EXAM
[Normal Breath Sounds] : Normal breath sounds [Exam Deferred] : exam was deferred [Normal Heart Sounds] : normal heart sounds [Normal Rate and Rhythm] : normal rate and rhythm [Alert] : alert [Oriented to Person] : oriented to person [Oriented to Place] : oriented to place [Oriented to Time] : oriented to time [Abdomen Tenderness] : ~T No ~M abdominal tenderness [Abdomen Masses] : No abdominal masses [de-identified] : soft, nontender, nondistended, well healed lower midline and stomal incisions, no hernias appreciated in supine or standing positions. [de-identified] : no apparent distress noted, [de-identified] : supple no masses noted

## 2024-04-01 NOTE — ASSESSMENT
[FreeTextEntry1] : 74F with history of diverticulitis, s/p rectosigmoid resection with DLI in 2000 and ileostomy closure with parastomal hernia repair in 2002, presenting with crampy abdominal pain at the prior ileostomy site. Pain has currently resolved. Denies obstructive symptoms at the time of the pain. She is having normal bowel movements. Of note patient rides stationary bike for 3 hours per day to help with lumbar stenosis, and admits she possible could have pulled a muscle doing so.   On exam her abdomen is soft, nontender, and nondistended. Her surgical incisions are well healed. No hernias noted while supine or standing.   Last colonoscopy was in October 2022 where an adenomatous polyp was removed. Patient will follow up in October 2025 for repeat colonoscopy and will follow up in the office as needed if abdominal pain recurs.

## 2024-04-01 NOTE — PHYSICAL EXAM
[Normal Breath Sounds] : Normal breath sounds [Exam Deferred] : exam was deferred [Normal Heart Sounds] : normal heart sounds [Normal Rate and Rhythm] : normal rate and rhythm [Alert] : alert [Oriented to Person] : oriented to person [Oriented to Place] : oriented to place [Oriented to Time] : oriented to time [Abdomen Masses] : No abdominal masses [Abdomen Tenderness] : ~T No ~M abdominal tenderness [de-identified] : soft, nontender, nondistended, well healed lower midline and stomal incisions, no hernias appreciated in supine or standing positions. [de-identified] : supple no masses noted  [de-identified] : no apparent distress noted,

## 2024-04-20 NOTE — PHYSICAL EXAM
[FreeTextEntry1] : General: this is a pleasant patient in no acute distress\par \par HEENT conjunctiva are normal, no tenderness in head\par \par CV: normal pulses, regular rate and rhythm\par \par Lungs: breathing is non-labored\par \par abd: soft and non-distended\par \par MSK:\par SLR: neg\par ALIZE: neg\par range of motion: restricted L spine\par tinnels: neg at wrists\par spurling:\par Occipital nerve tenderness:\par \par Mental status:\par Alert and oriented to person, place and time, normal speech and comprehension\par \par Cranial Nerves:\par extra-occular movements in tact without nystagmus, normal saccades and smooth pursuit, Face symmetric and facial strength symmetric, facial sensation symmetric, \par \par Motor: normal bulk and tone throughout. no abnormal movements.  Full 5/5 strength uppers and lower extremities proximally and distally except L APB 4+5, b/l hip flexion 4+.5, L knee ext 4/5, L foot dorsi 4+/5\par \par Sensory: in tact and symmetric to vibration, light tough, temperature\par \par Cerebellar: normal finger-nose-finger bilaterally\par \par Reflexes: 2+ in the upper and lower extremities and symmetric.  toes are bilaterally downgoing.\par \par Gait: stable, antalgic and slow\par \par Stances:\par Romberg: sway\par \par \par

## 2024-06-19 ENCOUNTER — TRANSCRIPTION ENCOUNTER (OUTPATIENT)
Age: 74
End: 2024-06-19

## 2024-06-20 ENCOUNTER — TRANSCRIPTION ENCOUNTER (OUTPATIENT)
Age: 74
End: 2024-06-20

## 2024-06-25 ENCOUNTER — TRANSCRIPTION ENCOUNTER (OUTPATIENT)
Age: 74
End: 2024-06-25

## 2024-06-28 ENCOUNTER — TRANSCRIPTION ENCOUNTER (OUTPATIENT)
Age: 74
End: 2024-06-28

## 2024-07-01 ENCOUNTER — TRANSCRIPTION ENCOUNTER (OUTPATIENT)
Age: 74
End: 2024-07-01

## 2024-07-08 ENCOUNTER — TRANSCRIPTION ENCOUNTER (OUTPATIENT)
Age: 74
End: 2024-07-08

## 2024-07-12 ENCOUNTER — TRANSCRIPTION ENCOUNTER (OUTPATIENT)
Age: 74
End: 2024-07-12

## 2024-07-15 ENCOUNTER — TRANSCRIPTION ENCOUNTER (OUTPATIENT)
Age: 74
End: 2024-07-15

## 2024-07-23 ENCOUNTER — APPOINTMENT (OUTPATIENT)
Dept: NEUROLOGY | Facility: CLINIC | Age: 74
End: 2024-07-23

## 2024-07-31 ENCOUNTER — NON-APPOINTMENT (OUTPATIENT)
Age: 74
End: 2024-07-31

## 2024-07-31 ENCOUNTER — APPOINTMENT (OUTPATIENT)
Dept: ORTHOPEDIC SURGERY | Facility: CLINIC | Age: 74
End: 2024-07-31

## 2024-07-31 VITALS
BODY MASS INDEX: 46.38 KG/M2 | HEIGHT: 62 IN | OXYGEN SATURATION: 98 % | DIASTOLIC BLOOD PRESSURE: 80 MMHG | SYSTOLIC BLOOD PRESSURE: 140 MMHG | HEART RATE: 71 BPM | TEMPERATURE: 98.4 F | WEIGHT: 252 LBS

## 2024-07-31 DIAGNOSIS — Z87.39 PERSONAL HISTORY OF OTHER DISEASES OF THE MUSCULOSKELETAL SYSTEM AND CONNECTIVE TISSUE: ICD-10-CM

## 2024-07-31 DIAGNOSIS — M48.061 SPINAL STENOSIS, LUMBAR REGION WITHOUT NEUROGENIC CLAUDICATION: ICD-10-CM

## 2024-07-31 PROCEDURE — 72110 X-RAY EXAM L-2 SPINE 4/>VWS: CPT

## 2024-07-31 PROCEDURE — 99204 OFFICE O/P NEW MOD 45 MIN: CPT

## 2024-07-31 PROCEDURE — 72050 X-RAY EXAM NECK SPINE 4/5VWS: CPT

## 2024-08-01 ENCOUNTER — TRANSCRIPTION ENCOUNTER (OUTPATIENT)
Age: 74
End: 2024-08-01

## 2024-08-02 DIAGNOSIS — M79.89 OTHER SPECIFIED SOFT TISSUE DISORDERS: ICD-10-CM

## 2024-08-06 ENCOUNTER — TRANSCRIPTION ENCOUNTER (OUTPATIENT)
Age: 74
End: 2024-08-06

## 2024-08-12 ENCOUNTER — APPOINTMENT (OUTPATIENT)
Dept: VASCULAR SURGERY | Facility: CLINIC | Age: 74
End: 2024-08-12
Payer: MEDICARE

## 2024-08-12 VITALS
DIASTOLIC BLOOD PRESSURE: 79 MMHG | HEIGHT: 62.5 IN | SYSTOLIC BLOOD PRESSURE: 130 MMHG | BODY MASS INDEX: 44.86 KG/M2 | WEIGHT: 250 LBS | HEART RATE: 64 BPM

## 2024-08-12 DIAGNOSIS — M79.89 OTHER SPECIFIED SOFT TISSUE DISORDERS: ICD-10-CM

## 2024-08-12 PROCEDURE — 99205 OFFICE O/P NEW HI 60 MIN: CPT

## 2024-08-12 PROCEDURE — 93970 EXTREMITY STUDY: CPT

## 2024-08-12 RX ORDER — OMEPRAZOLE 40 MG/1
40 CAPSULE, DELAYED RELEASE ORAL
Refills: 0 | Status: ACTIVE | COMMUNITY

## 2024-08-12 RX ORDER — SIMVASTATIN 20 MG/1
20 TABLET, FILM COATED ORAL
Refills: 0 | Status: ACTIVE | COMMUNITY

## 2024-08-12 RX ORDER — HYDROXYCHLOROQUINE SULFATE 200 MG/1
200 TABLET, FILM COATED ORAL
Refills: 0 | Status: ACTIVE | COMMUNITY

## 2024-08-12 RX ORDER — VIBEGRON 75 MG/1
75 TABLET, FILM COATED ORAL
Refills: 0 | Status: ACTIVE | COMMUNITY

## 2024-08-14 ENCOUNTER — APPOINTMENT (OUTPATIENT)
Dept: PAIN MANAGEMENT | Facility: CLINIC | Age: 74
End: 2024-08-14

## 2024-08-14 VITALS — SYSTOLIC BLOOD PRESSURE: 128 MMHG | DIASTOLIC BLOOD PRESSURE: 77 MMHG | OXYGEN SATURATION: 98 % | HEART RATE: 62 BPM

## 2024-08-14 DIAGNOSIS — M48.061 SPINAL STENOSIS, LUMBAR REGION WITHOUT NEUROGENIC CLAUDICATION: ICD-10-CM

## 2024-08-14 DIAGNOSIS — M54.16 RADICULOPATHY, LUMBAR REGION: ICD-10-CM

## 2024-08-14 PROCEDURE — 64483 NJX AA&/STRD TFRM EPI L/S 1: CPT | Mod: 50

## 2024-08-14 PROCEDURE — 99215 OFFICE O/P EST HI 40 MIN: CPT | Mod: 25

## 2024-08-14 PROCEDURE — 64484 NJX AA&/STRD TFRM EPI L/S EA: CPT | Mod: 50

## 2024-08-14 NOTE — ASSESSMENT
[FreeTextEntry1] : 74yoF with a history of venous insufficiency S/P left GSV RFA in 2014, rheumatoid arthritis, and osteoarthritis S/P bilateral TKR, presenting for a routine f/u. Denies claudication, rest pain, skin changes. On exam, bilateral mild legs edema and small varicosities, both legs are well perfused, no skin changes.  BL LE venous doppler was done in the office that demonstrated no evidence of DVT/SVT, L GSV closed, bilateral calves varicose veins noted We discussed the findings and explained that no vascular interventions at this time Recommended compression stockings. She may f/u prn.  I spent a total of 60 minutes in this encounter.

## 2024-08-14 NOTE — REVIEW OF SYSTEMS
[Lower Ext Edema] : lower extremity edema [Limb Pain] : limb pain [Limb Swelling] : limb swelling [Negative] : Heme/Lymph [Chest Pain] : no chest pain

## 2024-08-14 NOTE — HISTORY OF PRESENT ILLNESS
[FreeTextEntry1] : 74yoF, known to the practice, previously followed by Dr. Oh with a history of venous insufficiency S/P left GSV RFA in 2014, rheumatoid arthritis, osteoarthritis S/P bilateral TKR, presenting for reevaluation of bilateral legs discomfort. Pt states that she has lumbar radiculopathy/spinal stenosis, experiences paresthesias and discomfort of lower extremities, however she wants to make sure her circulation is ok. Denies claudication, rest pain, skin changes.

## 2024-08-14 NOTE — ADDENDUM
[FreeTextEntry1] : This note was written by Linda JARAMILLO, acting as a scribe for Dr. Jacob Richards.  I, Dr. Jacob Richards, have read and attest that all the information, medical decision-making, and discharge instructions within are true and accurate.  I, Dr. Jacob Richards, personally performed the evaluation and management (E/M) services for this new patient.  That E/M includes conducting the initial examination, assessing all conditions, and establishing the plan of care.  Today, my ACP, Linda JARAMILLO, was here to observe my evaluation and management services for this patient to be followed going forward.

## 2024-08-14 NOTE — PHYSICAL EXAM
[Normal Breath Sounds] : Normal breath sounds [Normal Heart Sounds] : normal heart sounds [2+] : left 2+ [Ankle Swelling (On Exam)] : present [Ankle Swelling Bilaterally] : bilaterally  [Varicose Veins Of Lower Extremities] : bilaterally [Ankle Swelling On The Right] : mild [Alert] : alert [Oriented to Person] : oriented to person [Oriented to Place] : oriented to place [Oriented to Time] : oriented to time [Calm] : calm [JVD] : no jugular venous distention  [Abdomen Tenderness] : ~T ~M No abdominal tenderness [de-identified] : The patient is morbidly obese and is sitting upright in NAD. [FreeTextEntry1] : Both lower extremities are non-tender to palpation and nonerythematous.   [de-identified] : FROM

## 2024-08-14 NOTE — REVIEW OF SYSTEMS
[Back Pain] : back pain [Neck Pain] : neck pain [Radiating Pain] : radiating pain [Decreased ROM] : decreased range of motion [Negative] : Genitourinary

## 2024-08-14 NOTE — PROCEDURE
[FreeTextEntry1] : BL LE venous doppler was done in the office that demonstrated no evidence of DVT/SVT, L GSV closed, bilateral calves varicose veins noted

## 2024-08-15 ENCOUNTER — NON-APPOINTMENT (OUTPATIENT)
Age: 74
End: 2024-08-15

## 2024-08-15 ENCOUNTER — TRANSCRIPTION ENCOUNTER (OUTPATIENT)
Age: 74
End: 2024-08-15

## 2024-08-16 ENCOUNTER — TRANSCRIPTION ENCOUNTER (OUTPATIENT)
Age: 74
End: 2024-08-16

## 2024-08-19 NOTE — PHYSICAL EXAM
[Normal] : Regular, no tachycardia [Normal Spine curvature] : normal spine curvature [Spinous Process Tenderness] : spinous process tenderness [Facet Tenderness] : facet tenderness [Paraspinal Tenderness] : paraspinal tenderness [Ataxic] : ataxic [Antalgic] : antalgic [Cane] : ambulates with cane [LE] : Sensory: Intact in bilateral lower extremities [de-identified] : limited ROM  [de-identified] : Difficult patellar reflexes due to history of b/l knee replacements

## 2024-08-19 NOTE — END OF VISIT
Pt reports that she has been dealing with a lot with her disabled, adult son.  She reports feeling anxiety x 3 days.  Pt states that she hopes that the dr will give something for anxiety.  She has a history of anxiety but hasn't followed up with her mental health provider recently.     [Time Spent: ___ minutes] : I have spent [unfilled] minutes of time on the encounter.

## 2024-08-19 NOTE — PHYSICAL EXAM
[Normal] : Regular, no tachycardia [Normal Spine curvature] : normal spine curvature [Spinous Process Tenderness] : spinous process tenderness [Facet Tenderness] : facet tenderness [Paraspinal Tenderness] : paraspinal tenderness [Ataxic] : ataxic [Antalgic] : antalgic [Cane] : ambulates with cane [LE] : Sensory: Intact in bilateral lower extremities [de-identified] : limited ROM  [de-identified] : Difficult patellar reflexes due to history of b/l knee replacements

## 2024-08-19 NOTE — ASSESSMENT
[FreeTextEntry1] : 74 year old female , pmhx of vertigo, previously seen for chronic neck pain, presents today for follow up for chronic low back pain and neurogenic claudication. Patient has symptoms of pain and radiating pain when she stands and when she walks. The pain is improved when she is seated and when she leans forward with her shopping cart. Lumbar MRI reviewed today which shows lumbar canal and foraminal stenosis as well as degenerative discs and disc herniations. Discussed Lumbar Epidural Steroid Injection. Patient wishes to proceed.  The potential benefits as well as rare but possible risks were reviewed with the patient.  These risks including infection including epidural abscess, meningitis, osteomyelitis, and discitis, bleeding including epidural hematoma, nerve injury, paralysis, failure to relieve pain or worse pain, headache, pneumothorax, elevated blood sugars, allergic reactions, adverse reactions to medications, vasovagal reactions, falls, etc. Following that discussion, we decided together that the potential benefits outweigh the potential risks and we decided to proceed with scheduling the procedure.  I answered all the patient's questions about the procedure including the technical details of the procedure and also offered that if any other questions arise we will also be happy to answer those on the day of the procedure. All questions today were answered to the patients satisfaction, and the patient stated their verbal understanding.    Plan: - Bilateral L3-L4 Transforaminal CLARISSE today - Follow up in 2 weeks  ----------------------  Transforaminal lumbar epidural steroid injection bilateral L3-4   After obtaining consent, pre-procedure blood pressure and heart rate were stable and recorded in the nursing record. Standard monitors were applied. The patient was placed in the prone position on the fluoroscopy table. The lumbosacral area was prepped with chloroprep. A 25 gauge 3.5 inch spinal needle was advanced to the safe triangle in the upper pole of the each foramen. No paresthesias were elicited with needle placement and aspiration was negative for blood and CSF.   Correct needle position was confirmed under real-time fluoroscopy. 5 mg dexamethasone plus 1.5 ml  0.5% lidocaine was slowly injected. The needle was removed. The same procedure was performed at the remaining nerve roots. The skin was cleansed and a sterile bandage was applied. The patient tolerated the procedure well and no complications were encountered. Following the procedure the patient's vital signs were stable. The patient was discharged home in good condition with post-procedural instructions.  Time Out: Immediately prior to the procedure, the following was verbally confirmed that there is a consent form and that the correct patient, planned procedure, site and side are consistent with documentation and that necessary equipment and/or blood products are available prior to the start of the case.  Total Dexamethasone 10 mg, Total Lidocaine 0.5% 3 ml  Complications: none EBL: <5 cc

## 2024-08-19 NOTE — PHYSICAL EXAM
[Normal] : Regular, no tachycardia [Normal Spine curvature] : normal spine curvature [Spinous Process Tenderness] : spinous process tenderness [Facet Tenderness] : facet tenderness [Paraspinal Tenderness] : paraspinal tenderness [Ataxic] : ataxic [Antalgic] : antalgic [Cane] : ambulates with cane [LE] : Sensory: Intact in bilateral lower extremities [de-identified] : limited ROM  [de-identified] : Difficult patellar reflexes due to history of b/l knee replacements

## 2024-08-19 NOTE — HISTORY OF PRESENT ILLNESS
[Back Pain] : back pain [Episodic] : episodic [6] : a current pain level of 6/10 [Burning] : burning [Shooting] : shooting [Sitting] : sitting [Rest] : rest [FreeTextEntry1] : Patient is a 74 year old female , pmhx of vertigo, previously seen for chronic neck pain, presents today for follow up for chronic low back pain and neurogenic claudication. Patient reports worsening low back pain. Pain is episodic and rated a 6/10. The pain is described as a tight a pulling type of pain which radiates down her legs in the anterior, lateral and posterior aspect. The pain is worse when she stands, walks, and sits for a prolonged period of time. She reports some tingling in her toes. The pain is improved when she leans slightly forward and walks with her shopping cart. She has tried multiple treatments including OTC medications, rest, and physical therapy, TENS unit, and rehab. She stays active by using a recumbent stationary bike for 90 minutes twice daily. She was recently seen by vascular and was told that she does not have any vascular issues in her bilateral lower extremities.

## 2024-08-19 NOTE — DATA REVIEWED
[FreeTextEntry1] : MRI Lumbar Spine 6/23/24 IMPRESSION: - L4/5 marked central canal and b/l foraminal stenosis - L2/3 and L3/4 moderate central canal and b/l foraminal stenosis - L5/S1 disc bulge and a broad disc herniation with thecal sac deformity  - Retrolisthesis at L1/L2 with diffuse disc bulge and marked foraminal stenosis  - Additional disc bulge and marked foraminal stenosis - Multilevel facet hypertrophy

## 2024-08-21 ENCOUNTER — TRANSCRIPTION ENCOUNTER (OUTPATIENT)
Age: 74
End: 2024-08-21

## 2024-08-28 ENCOUNTER — APPOINTMENT (OUTPATIENT)
Dept: PAIN MANAGEMENT | Facility: CLINIC | Age: 74
End: 2024-08-28
Payer: MEDICARE

## 2024-08-28 DIAGNOSIS — M48.061 SPINAL STENOSIS, LUMBAR REGION WITHOUT NEUROGENIC CLAUDICATION: ICD-10-CM

## 2024-08-28 DIAGNOSIS — M54.16 RADICULOPATHY, LUMBAR REGION: ICD-10-CM

## 2024-08-28 PROCEDURE — 99215 OFFICE O/P EST HI 40 MIN: CPT

## 2024-08-28 NOTE — ASSESSMENT
[FreeTextEntry1] : The patient is doing well following her lumbar epidural injection.  She will contact the office should her symptoms become more severe.  All questions of were answered to her satisfaction.

## 2024-08-28 NOTE — HISTORY OF PRESENT ILLNESS
[Home] : at home, [unfilled] , at the time of the visit. [Medical Office: ([facility])___] : at the medical office located in  [Verbal consent obtained from patient] : the patient, [unfilled] [FreeTextEntry1] : Patient is a 74 year old female , pmhx of vertigo, previously seen for chronic neck pain, presents today for follow up for chronic low back pain and neurogenic claudication. She is s/p  L3-4 epidural steroid injection.  She states that the pain is not as severe, she rates it a 4/10 whereas prio it was an 8/10.  Patient reports worsening low back pain.  The pain is described as a tight a pulling type of pain which radiates down her legs in the anterior, lateral and posterior aspect. The pain is worse when she stands, walks, and sits for a prolonged period of time.   She reports that her GI system was upset after the injection, but those symptoms have resolved.

## 2024-09-03 ENCOUNTER — APPOINTMENT (OUTPATIENT)
Dept: ORTHOPEDIC SURGERY | Facility: CLINIC | Age: 74
End: 2024-09-03
Payer: MEDICARE

## 2024-09-03 PROCEDURE — 99442: CPT | Mod: 93

## 2024-09-04 ENCOUNTER — TRANSCRIPTION ENCOUNTER (OUTPATIENT)
Age: 74
End: 2024-09-04

## 2024-09-30 ENCOUNTER — APPOINTMENT (OUTPATIENT)
Dept: NEUROLOGY | Age: 74
End: 2024-09-30
Payer: MEDICARE

## 2024-09-30 DIAGNOSIS — R26.89 OTHER ABNORMALITIES OF GAIT AND MOBILITY: ICD-10-CM

## 2024-09-30 DIAGNOSIS — G62.9 POLYNEUROPATHY, UNSPECIFIED: ICD-10-CM

## 2024-09-30 PROCEDURE — 99214 OFFICE O/P EST MOD 30 MIN: CPT

## 2024-09-30 NOTE — DATA REVIEWED
[de-identified] : L2/L3 and L3/L4 mod stenosis, and L4/L5 marked canal, L1/L2 retrolisthesis reportedly + RA  outside notes reviewed

## 2024-09-30 NOTE — PHYSICAL EXAM
[FreeTextEntry1] : MS AAO x 3, normal comprehension, normal speech CN: EOMI, face symmetric, tongue and uvula midline Motor: moves all extremities equally, no abnormal movements Sensory: symmetric to LT all extremities

## 2024-09-30 NOTE — HISTORY OF PRESENT ILLNESS
[FreeTextEntry1] : ALISIA BENTLEY is a 74 year who returns to follow up for radiculopathy,   last visit was 6/7/2023  since last visit we spoke Kei 19th 2024 with worsening balance so I referred for L spine MRI  did see Dr Ramos who suggested she has too many problems to do surgery so suggested pain mgmt eval. ESIs were very painful and also caused some insomnia and also did not really improve her condition

## 2024-11-07 ENCOUNTER — APPOINTMENT (OUTPATIENT)
Dept: NEUROLOGY | Age: 74
End: 2024-11-07
Payer: MEDICARE

## 2024-11-07 VITALS
TEMPERATURE: 97.5 F | DIASTOLIC BLOOD PRESSURE: 84 MMHG | BODY MASS INDEX: 45.9 KG/M2 | OXYGEN SATURATION: 98 % | RESPIRATION RATE: 17 BRPM | HEART RATE: 71 BPM | WEIGHT: 255 LBS | SYSTOLIC BLOOD PRESSURE: 127 MMHG

## 2024-11-07 DIAGNOSIS — M54.16 RADICULOPATHY, LUMBAR REGION: ICD-10-CM

## 2024-11-07 DIAGNOSIS — G62.9 POLYNEUROPATHY, UNSPECIFIED: ICD-10-CM

## 2024-11-07 PROCEDURE — 95885 MUSC TST DONE W/NERV TST LIM: CPT

## 2024-11-07 PROCEDURE — 99213 OFFICE O/P EST LOW 20 MIN: CPT

## 2024-11-07 PROCEDURE — 95910 NRV CNDJ TEST 7-8 STUDIES: CPT

## 2024-11-07 RX ORDER — DULOXETINE HYDROCHLORIDE 20 MG/1
20 CAPSULE, DELAYED RELEASE PELLETS ORAL
Qty: 60 | Refills: 2 | Status: ACTIVE | COMMUNITY
Start: 2024-11-07 | End: 1900-01-01

## 2024-11-08 ENCOUNTER — TRANSCRIPTION ENCOUNTER (OUTPATIENT)
Age: 74
End: 2024-11-08

## 2024-11-11 ENCOUNTER — TRANSCRIPTION ENCOUNTER (OUTPATIENT)
Age: 74
End: 2024-11-11

## 2024-11-18 ENCOUNTER — TRANSCRIPTION ENCOUNTER (OUTPATIENT)
Age: 74
End: 2024-11-18

## 2024-11-19 ENCOUNTER — TRANSCRIPTION ENCOUNTER (OUTPATIENT)
Age: 74
End: 2024-11-19

## 2024-11-21 ENCOUNTER — TRANSCRIPTION ENCOUNTER (OUTPATIENT)
Age: 74
End: 2024-11-21

## 2024-11-25 ENCOUNTER — TRANSCRIPTION ENCOUNTER (OUTPATIENT)
Age: 74
End: 2024-11-25

## 2024-11-29 ENCOUNTER — TRANSCRIPTION ENCOUNTER (OUTPATIENT)
Age: 74
End: 2024-11-29

## 2024-12-09 ENCOUNTER — TRANSCRIPTION ENCOUNTER (OUTPATIENT)
Age: 74
End: 2024-12-09

## 2024-12-17 ENCOUNTER — TRANSCRIPTION ENCOUNTER (OUTPATIENT)
Age: 74
End: 2024-12-17

## 2024-12-31 ENCOUNTER — TRANSCRIPTION ENCOUNTER (OUTPATIENT)
Age: 74
End: 2024-12-31

## 2025-01-02 ENCOUNTER — TRANSCRIPTION ENCOUNTER (OUTPATIENT)
Age: 75
End: 2025-01-02

## 2025-01-21 RX ORDER — POLYETHYLENE GLYCOL-3350 AND ELECTROLYTES 236; 6.74; 5.86; 2.97; 22.74 G/274.31G; G/274.31G; G/274.31G; G/274.31G; G/274.31G
236 POWDER, FOR SOLUTION ORAL
Qty: 1 | Refills: 0 | Status: ACTIVE | COMMUNITY
Start: 2025-01-21 | End: 1900-01-01

## 2025-02-05 ENCOUNTER — APPOINTMENT (OUTPATIENT)
Dept: COLORECTAL SURGERY | Facility: AMBULATORY SURGERY CENTER | Age: 75
End: 2025-02-05
Payer: MEDICARE

## 2025-02-05 ENCOUNTER — RESULT REVIEW (OUTPATIENT)
Age: 75
End: 2025-02-05

## 2025-02-05 PROCEDURE — 45385 COLONOSCOPY W/LESION REMOVAL: CPT | Mod: 33

## 2025-02-10 ENCOUNTER — NON-APPOINTMENT (OUTPATIENT)
Age: 75
End: 2025-02-10

## 2025-02-19 ENCOUNTER — APPOINTMENT (OUTPATIENT)
Dept: COLORECTAL SURGERY | Facility: AMBULATORY SURGERY CENTER | Age: 75
End: 2025-02-19

## 2025-03-31 ENCOUNTER — TRANSCRIPTION ENCOUNTER (OUTPATIENT)
Age: 75
End: 2025-03-31

## 2025-04-01 ENCOUNTER — APPOINTMENT (OUTPATIENT)
Dept: NEUROLOGY | Age: 75
End: 2025-04-01
Payer: MEDICARE

## 2025-04-01 DIAGNOSIS — M54.16 RADICULOPATHY, LUMBAR REGION: ICD-10-CM

## 2025-04-01 DIAGNOSIS — R26.89 OTHER ABNORMALITIES OF GAIT AND MOBILITY: ICD-10-CM

## 2025-04-01 DIAGNOSIS — M47.812 SPONDYLOSIS W/OUT MYELOPATHY OR RADICULOPATHY, CERVICAL REGION: ICD-10-CM

## 2025-04-01 PROCEDURE — 99214 OFFICE O/P EST MOD 30 MIN: CPT | Mod: 2W

## 2025-04-01 RX ORDER — SUZETRIGINE 50 MG/1
50 TABLET, FILM COATED ORAL
Qty: 60 | Refills: 1 | Status: ACTIVE | COMMUNITY
Start: 2025-04-01 | End: 1900-01-01

## 2025-04-04 ENCOUNTER — TRANSCRIPTION ENCOUNTER (OUTPATIENT)
Age: 75
End: 2025-04-04